# Patient Record
Sex: FEMALE | Race: WHITE | Employment: FULL TIME | URBAN - METROPOLITAN AREA
[De-identification: names, ages, dates, MRNs, and addresses within clinical notes are randomized per-mention and may not be internally consistent; named-entity substitution may affect disease eponyms.]

---

## 2024-03-10 ENCOUNTER — HOSPITAL ENCOUNTER (EMERGENCY)
Age: 39
Discharge: HOME | End: 2024-03-10
Attending: STUDENT IN AN ORGANIZED HEALTH CARE EDUCATION/TRAINING PROGRAM
Payer: COMMERCIAL

## 2024-03-10 VITALS
BODY MASS INDEX: 33.31 KG/M2 | HEIGHT: 62 IN | RESPIRATION RATE: 14 BRPM | SYSTOLIC BLOOD PRESSURE: 121 MMHG | DIASTOLIC BLOOD PRESSURE: 86 MMHG | OXYGEN SATURATION: 97 % | TEMPERATURE: 97.9 F | HEART RATE: 107 BPM | WEIGHT: 181 LBS

## 2024-03-10 DIAGNOSIS — R11.2 NAUSEA AND VOMITING, UNSPECIFIED VOMITING TYPE: ICD-10-CM

## 2024-03-10 DIAGNOSIS — M79.10 MYALGIA: Primary | ICD-10-CM

## 2024-03-10 DIAGNOSIS — G44.209 TENSION HEADACHE: ICD-10-CM

## 2024-03-10 DIAGNOSIS — B34.9 VIRAL ILLNESS: ICD-10-CM

## 2024-03-10 LAB
INFLUENZA A ANTIGEN: NOT DETECTED
INFLUENZA B ANTIGEN: NOT DETECTED
SARS-COV-2 RDRP RESP QL NAA+PROBE: NOT DETECTED
SOURCE: NORMAL

## 2024-03-10 PROCEDURE — 96374 THER/PROPH/DIAG INJ IV PUSH: CPT

## 2024-03-10 PROCEDURE — 6360000002 HC RX W HCPCS: Performed by: STUDENT IN AN ORGANIZED HEALTH CARE EDUCATION/TRAINING PROGRAM

## 2024-03-10 PROCEDURE — 96375 TX/PRO/DX INJ NEW DRUG ADDON: CPT

## 2024-03-10 PROCEDURE — 87502 INFLUENZA DNA AMP PROBE: CPT

## 2024-03-10 PROCEDURE — 2580000003 HC RX 258: Performed by: STUDENT IN AN ORGANIZED HEALTH CARE EDUCATION/TRAINING PROGRAM

## 2024-03-10 PROCEDURE — 87635 SARS-COV-2 COVID-19 AMP PRB: CPT

## 2024-03-10 PROCEDURE — 99284 EMERGENCY DEPT VISIT MOD MDM: CPT

## 2024-03-10 RX ORDER — METOCLOPRAMIDE HYDROCHLORIDE 5 MG/ML
10 INJECTION INTRAMUSCULAR; INTRAVENOUS ONCE
Status: COMPLETED | OUTPATIENT
Start: 2024-03-10 | End: 2024-03-10

## 2024-03-10 RX ORDER — KETOROLAC TROMETHAMINE 15 MG/ML
15 INJECTION, SOLUTION INTRAMUSCULAR; INTRAVENOUS ONCE
Status: COMPLETED | OUTPATIENT
Start: 2024-03-10 | End: 2024-03-10

## 2024-03-10 RX ORDER — SODIUM CHLORIDE, SODIUM LACTATE, POTASSIUM CHLORIDE, AND CALCIUM CHLORIDE .6; .31; .03; .02 G/100ML; G/100ML; G/100ML; G/100ML
1000 INJECTION, SOLUTION INTRAVENOUS ONCE
Status: COMPLETED | OUTPATIENT
Start: 2024-03-10 | End: 2024-03-10

## 2024-03-10 RX ADMIN — METOCLOPRAMIDE 10 MG: 5 INJECTION, SOLUTION INTRAMUSCULAR; INTRAVENOUS at 14:01

## 2024-03-10 RX ADMIN — SODIUM CHLORIDE, POTASSIUM CHLORIDE, SODIUM LACTATE AND CALCIUM CHLORIDE 1000 ML: 600; 310; 30; 20 INJECTION, SOLUTION INTRAVENOUS at 14:01

## 2024-03-10 RX ADMIN — KETOROLAC TROMETHAMINE 15 MG: 15 INJECTION, SOLUTION INTRAMUSCULAR; INTRAVENOUS at 14:01

## 2024-03-10 ASSESSMENT — PAIN SCALES - GENERAL
PAINLEVEL_OUTOF10: 7
PAINLEVEL_OUTOF10: 7

## 2024-03-10 ASSESSMENT — PAIN DESCRIPTION - LOCATION: LOCATION: HEAD

## 2024-03-10 ASSESSMENT — PAIN - FUNCTIONAL ASSESSMENT: PAIN_FUNCTIONAL_ASSESSMENT: 0-10

## 2024-03-10 NOTE — DISCHARGE INSTRUCTIONS
-Take Tylenol (1000 mg, every 6-8 hours) and/or ibuprofen (600 mg, every 6-8 hours) as needed for pain  -Take Zofran as needed for nausea and vomiting  -Drink plenty of fluids, ideally Gatorade or other sports drink  -Follow-up with your primary care doctor if symptoms do not get better in the next 3 to 5 days, see Jose PERERA if you need a new provider  -Come back to emergency department if you develop severe vomiting, severe shortness of breath, severe pain, if you pass out or if you are concerned

## 2024-03-10 NOTE — ED PROVIDER NOTES
Emergency Department Encounter    Patient: Justina White  MRN: [id number]  : 1985  Date of Evaluation: 3/12/2024  ED Provider:  Benjy Du MD    Triage Chief Complaint:   Nausea, Generalized Body Aches, and Headache    [place]:  Justina White is a 38 y.o. female that presents with malaise, chills, headache, vomiting.  Patient reported symptoms started this morning.  She took Zofran and Tylenol in the morning, however she felt nauseous later in the day and was sent to the ED.  She also reports a headache that is 7 out of 10, located in the bilateral temples, not worse with light or noise as, no numbness or tingling associated with it.  This is associated with nausea, and 1 episode of emesis, she took a second consult for melena around 11 AM, there was no blood on the emesis.  She also endorses some bodyaches.  Patient reports that some of her coworkers have been sick.  She denies any chest pain, shortness of breath, abdominal pain, constipation, diarrhea, lower extremity edema, lower extremity pain.  Of note, patient is breast-feeding    PMHx: none  Meds: none, zofran from last pregnancy   Allergies: none     ROS - see HPI    No past medical history on file.  Past Surgical History:   Procedure Laterality Date     SECTION       No family history on file.  Social History     Socioeconomic History    Marital status: Single     Spouse name: Not on file    Number of children: Not on file    Years of education: Not on file    Highest education level: Not on file   Occupational History    Not on file   Tobacco Use    Smoking status: Not on file    Smokeless tobacco: Not on file   Substance and Sexual Activity    Alcohol use: Not on file    Drug use: Not on file    Sexual activity: Not on file   Other Topics Concern    Not on file   Social History Narrative    Not on file     Social Determinants of Health     Financial Resource Strain: Not on file   Food Insecurity: Not on file   Transportation